# Patient Record
Sex: MALE | Race: WHITE | NOT HISPANIC OR LATINO | Employment: FULL TIME | URBAN - NONMETROPOLITAN AREA
[De-identification: names, ages, dates, MRNs, and addresses within clinical notes are randomized per-mention and may not be internally consistent; named-entity substitution may affect disease eponyms.]

---

## 2024-05-31 ENCOUNTER — OFFICE VISIT (OUTPATIENT)
Dept: FAMILY MEDICINE CLINIC | Facility: CLINIC | Age: 59
End: 2024-05-31
Payer: COMMERCIAL

## 2024-05-31 VITALS
SYSTOLIC BLOOD PRESSURE: 142 MMHG | RESPIRATION RATE: 18 BRPM | BODY MASS INDEX: 29.48 KG/M2 | OXYGEN SATURATION: 96 % | HEART RATE: 84 BPM | WEIGHT: 222.4 LBS | HEIGHT: 73 IN | DIASTOLIC BLOOD PRESSURE: 90 MMHG

## 2024-05-31 DIAGNOSIS — R53.83 OTHER FATIGUE: ICD-10-CM

## 2024-05-31 DIAGNOSIS — M79.671 FOOT PAIN, BILATERAL: ICD-10-CM

## 2024-05-31 DIAGNOSIS — R73.03 PRE-DIABETES: ICD-10-CM

## 2024-05-31 DIAGNOSIS — R05.3 CHRONIC COUGH: ICD-10-CM

## 2024-05-31 DIAGNOSIS — Z23 NEED FOR VACCINATION: ICD-10-CM

## 2024-05-31 DIAGNOSIS — I10 PRIMARY HYPERTENSION: ICD-10-CM

## 2024-05-31 DIAGNOSIS — Z00.00 ANNUAL PHYSICAL EXAM: Primary | ICD-10-CM

## 2024-05-31 DIAGNOSIS — M79.672 FOOT PAIN, BILATERAL: ICD-10-CM

## 2024-05-31 DIAGNOSIS — K50.80 CROHN'S DISEASE OF BOTH SMALL AND LARGE INTESTINE WITHOUT COMPLICATION: ICD-10-CM

## 2024-05-31 DIAGNOSIS — Z11.59 NEED FOR HEPATITIS C SCREENING TEST: ICD-10-CM

## 2024-05-31 RX ORDER — OMEPRAZOLE 20 MG/1
20 CAPSULE, DELAYED RELEASE ORAL DAILY
Qty: 30 CAPSULE | Refills: 2 | Status: SHIPPED | OUTPATIENT
Start: 2024-05-31

## 2024-05-31 RX ORDER — LOSARTAN POTASSIUM 25 MG/1
25 TABLET ORAL DAILY
Qty: 30 TABLET | Refills: 5 | Status: SHIPPED | OUTPATIENT
Start: 2024-05-31

## 2024-05-31 NOTE — LETTER
[facility]  Vaccine Consent Form    Patient Name:  Miguel Terrell  Patient :  1965     Vaccine(s) Ordered    Tdap Vaccine Greater Than or Equal To 8yo IM        Screening Checklist  The following questions should be completed prior to vaccination. If you answer “yes” to any question, it does not necessarily mean you should not be vaccinated. It just means we may need to clarify or ask more questions. If a question is unclear, please ask your healthcare provider to explain it.    Yes No   Any fever or moderate to severe illness today (mild illness and/or antibiotic treatment are not contraindications)?     Do you have a history of a serious reaction to any previous vaccinations, such as anaphylaxis, encephalopathy within 7 days, Guillain-[place] syndrome within 6 weeks, seizure?     Have you received any live vaccine(s) (e.g MMR, ISMAEL) or any other vaccines in the last month (to ensure duplicate doses aren't given)?     Do you have an anaphylactic allergy to latex (DTaP, DTaP-IPV, Hep A, Hep B, MenB, RV, Td, Tdap), baker’s yeast (Hep B, HPV), polysorbates (RSV, nirsevimab, PCV 20, Rotavirrus, Tdap, Shingrix), or gelatin (ISMAEL, MMR)?     Do you have an anaphylactic allergy to neomycin (Rabies, ISMAEL, MMR, IPV, Hep A), polymyxin B (IPV), or streptomycin (IPV)?      Any cancer, leukemia, AIDS, or other immune system disorder? (ISMAEL, MMR, RV)     Do you have a parent, brother, or sister with an immune system problem (if immune competence of vaccine recipient clinically verified, can proceed)? (MMR, ISMAEL)     Any recent steroid treatments for >2 weeks, chemotherapy, or radiation treatment? (ISMAEL, MMR)     Have you received antibody-containing blood transfusions or IVIG in the past 11 months (recommended interval is dependent on product)? (MMR, ISMAEL)     Have you taken antiviral drugs (acyclovir, famciclovir, valacyclovir for ISMAEL) in the last 24 or 48 hours, respectively?      Are you pregnant or planning to become  "pregnant within 1 month? (ISMAEL, MMR, HPV, IPV, MenB, Abrexvy; For Hep B- refer to Engerix-B; For RSV - Abrysvo is indicated for 32-36 weeks of pregnancy from September to January)     For infants, have you ever been told your child has had intussusception or a medical emergency involving obstruction of the intestine (Rotavirus)? If not for an infant, can skip this question.         *Ordering Physicians/APC should be consulted if \"yes\" is checked by the patient or guardian above.  I have received, read, and understand the Vaccine Information Statement (VIS) for each vaccine ordered.  I have considered my or my child's health status as well as the health status of my close contacts.  I have taken the opportunity to discuss my vaccine questions with my or my child's health care provider.   I have requested that the ordered vaccine(s) be given to me or my child.  I understand the benefits and risks of the vaccines.  I understand that I should remain in the clinic for 15 minutes after receiving the vaccine(s).  _________________________________________________________  Signature of Patient or Parent/Legal Guardian ____________________  Date     "

## 2024-05-31 NOTE — PROGRESS NOTES
New Patient Office Visit      Date: 24   Patient Name: Miguel Terrell  : 1965   MRN: [id number]     Chief Complaint:    Chief Complaint   Patient presents with   • Establish Care   • Annual Exam   • Cough     Patient states that he has had a persistent cough for about 2 months. States it is getting better but still there.    • Foot Pain     Bilateral, more on the left.        History of Present Illness: Miguel Terrell is a 59 y.o. male who is here today for a get acquainted visit to establish care with a new provider. I have reviewed the patient's medical history and updated the computerized patient record. Baseline screening tests were discussed today and pt agrees to discuss health maintenance and goals in future appointments.    HPI Notes:  Cough  This is a recurrent problem. The current episode started more than 1 month ago. The problem has been improved. The problem occurs intermittent. The cough is Dry.   Foot Pain  This is a chronic problem. The current episode started more than 1 year ago. The problem occurs constantly. The problem has been unchanged. Associated symptoms include coughing.        Subjective     Past Medical History:   Past Medical History:   Diagnosis Date   • Hypertension    • Prediabetes        Past Surgical History:   Past Surgical History:   Procedure Laterality Date   • COLONOSCOPY     • FRACTURE SURGERY Right     ORIF - retained hardware right wrist       Family History: History reviewed. No pertinent family history.    Social History:   Social History     Socioeconomic History   • Marital status: Single   Tobacco Use   • Smoking status: Former     Current packs/day: 0.00     Average packs/day: 1 pack/day for 20.0 years (20.0 ttl pk-yrs)     Types: Cigarettes     Start date: 1980     Quit date: 2000     Years since quittin.4   • Smokeless tobacco: Never   Vaping Use   • Vaping status: Never Used   Substance and Sexual Activity   • Alcohol use:  "Not Currently   • Drug use: Never   • Sexual activity: Not Currently       Medications:     Current Outpatient Medications:   •  losartan (COZAAR) 25 MG tablet, Take 1 tablet by mouth Daily., Disp: 30 tablet, Rfl: 5  •  omeprazole (priLOSEC) 20 MG capsule, Take 1 capsule by mouth Daily., Disp: 30 capsule, Rfl: 2    Allergies:   Allergies   Allergen Reactions   • Augmentin [Amoxicillin-Pot Clavulanate] Rash   • Amoxicillin Itching       Objective     Vital Signs:   Vitals:    05/31/24 1311   BP: 142/90   BP Location: Right arm   Patient Position: Sitting   Cuff Size: Large Adult   Pulse: 84   Resp: 18   SpO2: 96%   Weight: 101 kg (222 lb 6.4 oz)   Height: 185.4 cm (73\")   PainSc: 0-No pain     Body mass index is 29.34 kg/m².   BMI is >= 25 and <30. (Overweight) The following options were offered after discussion;: exercise counseling/recommendations and information on healthy weight added to patient's after visit summary        Physical Exam  Vitals and nursing note reviewed.   Constitutional:       General: He is not in acute distress.     Appearance: He is not toxic-appearing.   HENT:      Head: Normocephalic and atraumatic.      Right Ear: Tympanic membrane, ear canal and external ear normal.      Left Ear: Tympanic membrane, ear canal and external ear normal.      Nose: Nose normal.      Mouth/Throat:      Mouth: Mucous membranes are moist.      Dentition: Normal dentition.      Tongue: No lesions.      Palate: No mass.      Pharynx: Posterior oropharyngeal erythema present. No oropharyngeal exudate or uvula swelling.      Tonsils: No tonsillar exudate or tonsillar abscesses.   Eyes:      Extraocular Movements: Extraocular movements intact.      Conjunctiva/sclera: Conjunctivae normal.      Pupils: Pupils are equal, round, and reactive to light.   Neck:      Thyroid: No thyroid mass or thyroid tenderness.      Trachea: Trachea and phonation normal.   Cardiovascular:      Rate and Rhythm: Normal rate and regular " rhythm.      Pulses: Normal pulses.      Heart sounds: Normal heart sounds.   Pulmonary:      Effort: Pulmonary effort is normal. No respiratory distress.      Breath sounds: Normal breath sounds.   Abdominal:      General: Bowel sounds are normal. There is no distension.      Palpations: Abdomen is soft.      Tenderness: There is no abdominal tenderness.   Musculoskeletal:         General: Normal range of motion.      Cervical back: Normal range of motion and neck supple. No tenderness.      Right lower leg: No edema.      Left lower leg: No edema.   Lymphadenopathy:      Cervical: No cervical adenopathy.   Skin:     General: Skin is warm and dry.      Capillary Refill: Capillary refill takes less than 2 seconds.   Neurological:      General: No focal deficit present.      Mental Status: He is alert and oriented to person, place, and time.      Motor: No weakness.      Gait: Gait normal.   Psychiatric:         Mood and Affect: Mood normal.         Behavior: Behavior normal.         Thought Content: Thought content does not include suicidal ideation.       PHQ-9 Total Score: 0     Assessment / Plan      Assessment/Plan:   Diagnoses and all orders for this visit:    1. Annual physical exam (Primary)  Assessment & Plan:  New patient presents today for annual wellness exam with chief complaint of ongoing cough and daytime fatigue.  Available medical records reviewed today with the patient and appropriate diagnostic testing ordered.  Current medication refills provided per patient request.  Patient education materials attached to AVS related to health maintenance recommendations and healthy lifestyle. Materials were reviewed with patient during their office visit today and all questions addressed.  Patient agrees to follow-up in 2-4 weeks to discuss diagnostic testing, address health goals,  and address care gaps.    Orders:  -     CBC Auto Differential  -     Comprehensive Metabolic Panel  -     Lipid Panel    2.  Primary hypertension  Assessment & Plan:  Patient has new onset Hypertension  Ambulatory BP monitoring  Medication changes per orders.  Advised to check BP regularly and call office if frequently >140/90  Blood pressure will be reassessed in 4 weeks.    Orders:  -     losartan (COZAAR) 25 MG tablet; Take 1 tablet by mouth Daily.  Dispense: 30 tablet; Refill: 5    3. Pre-diabetes  Assessment & Plan:  Patient has history of prediabetes but has not had hemoglobin A1c in quite some time, we will check that today  No change in treatment regimen pending lab results  Patient to follow-up in 2 weeks to discuss results, health goals, and address additional care gaps.    Orders:  -     Hemoglobin A1c  -     TSH Rfx On Abnormal To Free T4    4. Foot pain, bilateral    5. Need for hepatitis C screening test  -     Hepatitis C Antibody; Future  -     Hepatitis C Antibody    6. Need for vaccination  -     Tdap Vaccine Greater Than or Equal To 6yo IM    7. Crohn's disease of both small and large intestine without complication  Assessment & Plan:  Patient mentioned history of Crohn's today during his visit, denies recent flares  No change in treatment regimen at this time, will reevaluate as needed and at routine follow-up visits      8. Chronic cough  Assessment & Plan:  Patient complains of ongoing cough, see HPI  Antihypertensive medications have been changed, we will start PPI today although he has not noticed acid brash or other GERD symptoms  Patient mentions some daytime fatigue as well as cough, he agrees to referral to ENT to evaluate for KELLEY and address chronic cough as well.  We will reevaluate as needed and at routine follow-up visits    Orders:  -     omeprazole (priLOSEC) 20 MG capsule; Take 1 capsule by mouth Daily.  Dispense: 30 capsule; Refill: 2  -     Ambulatory Referral to ENT (Otolaryngology)    9. Other fatigue  -     Ambulatory Referral to ENT (Otolaryngology)       Patient education materials attached to AVS  - Health Maintenance Recommendations. Materials were reviewed with patient during their office visit today and all questions addressed.      Follow Up:   Return in about 4 weeks (around 6/28/2024) for Recheck, Hypertension recheck.    KENZIE Wheeler (Libby) [facility]  [address] [facility] [id number]  [phone]    NOTE TO PATIENT: The 21st Century Cures Act makes medical notes like these available to patients in the interest of transparency. However, be advised this is a medical document. It is intended as peer to peer communication. It is written in medical language and may contain abbreviations or verbiage that are unfamiliar. It may appear blunt or direct. Medical documents are intended to carry relevant information, facts as evident, and the clinical opinion of the practitioner.      EMR Dragon/Transcription disclaimer:  Much of this encounter note is an electronic transcription of spoken language to printed text. Electronic transcription of spoken language may permit erroneous, or at times, nonsensical words or phrases to be inadvertently transcribed. Although I have reviewed the note for such errors, some may still exist.

## 2024-05-31 NOTE — ASSESSMENT & PLAN NOTE
Patient has new onset Hypertension  Ambulatory BP monitoring  Medication changes per orders.  Advised to check BP regularly and call office if frequently >140/90  Blood pressure will be reassessed in 4 weeks.

## 2024-06-01 LAB
ALBUMIN SERPL-MCNC: 4.6 G/DL (ref 3.8–4.9)
ALBUMIN/GLOB SERPL: 1.9 {RATIO} (ref 1.2–2.2)
ALP SERPL-CCNC: 66 IU/L (ref 44–121)
ALT SERPL-CCNC: 35 IU/L (ref 0–44)
AST SERPL-CCNC: 25 IU/L (ref 0–40)
BASOPHILS # BLD AUTO: 0.1 X10E3/UL (ref 0–0.2)
BASOPHILS NFR BLD AUTO: 1 %
BILIRUB SERPL-MCNC: 0.9 MG/DL (ref 0–1.2)
BUN SERPL-MCNC: 14 MG/DL (ref 8–27)
BUN/CREAT SERPL: 16 (ref 10–24)
CALCIUM SERPL-MCNC: 9.7 MG/DL (ref 8.6–10.2)
CHLORIDE SERPL-SCNC: 102 MMOL/L (ref 96–106)
CHOLEST SERPL-MCNC: 114 MG/DL (ref 100–199)
CO2 SERPL-SCNC: 21 MMOL/L (ref 20–29)
CREAT SERPL-MCNC: 0.85 MG/DL (ref 0.76–1.27)
EGFRCR SERPLBLD CKD-EPI 2021: 99 ML/MIN/1.73
EOSINOPHIL # BLD AUTO: 0.3 X10E3/UL (ref 0–0.4)
EOSINOPHIL NFR BLD AUTO: 5 %
ERYTHROCYTE [DISTWIDTH] IN BLOOD BY AUTOMATED COUNT: 12.7 % (ref 11.6–15.4)
GLOBULIN SER CALC-MCNC: 2.4 G/DL (ref 1.5–4.5)
GLUCOSE SERPL-MCNC: 273 MG/DL (ref 70–99)
HBA1C MFR BLD: 7.7 % (ref 4.8–5.6)
HCT VFR BLD AUTO: 49.4 % (ref 37.5–51)
HDLC SERPL-MCNC: 34 MG/DL
HGB BLD-MCNC: 17 G/DL (ref 13–17.7)
IMM GRANULOCYTES # BLD AUTO: 0 X10E3/UL (ref 0–0.1)
IMM GRANULOCYTES NFR BLD AUTO: 0 %
LDLC SERPL CALC-MCNC: 49 MG/DL (ref 0–99)
LYMPHOCYTES # BLD AUTO: 1.2 X10E3/UL (ref 0.7–3.1)
LYMPHOCYTES NFR BLD AUTO: 17 %
MCH RBC QN AUTO: 31.3 PG (ref 26.6–33)
MCHC RBC AUTO-ENTMCNC: 34.4 G/DL (ref 31.5–35.7)
MCV RBC AUTO: 91 FL (ref 79–97)
MONOCYTES # BLD AUTO: 0.5 X10E3/UL (ref 0.1–0.9)
MONOCYTES NFR BLD AUTO: 8 %
NEUTROPHILS # BLD AUTO: 4.7 X10E3/UL (ref 1.4–7)
NEUTROPHILS NFR BLD AUTO: 69 %
PLATELET # BLD AUTO: 255 X10E3/UL (ref 150–450)
POTASSIUM SERPL-SCNC: 4 MMOL/L (ref 3.5–5.2)
PROT SERPL-MCNC: 7 G/DL (ref 6–8.5)
RBC # BLD AUTO: 5.43 X10E6/UL (ref 4.14–5.8)
SODIUM SERPL-SCNC: 137 MMOL/L (ref 134–144)
TRIGL SERPL-MCNC: 187 MG/DL (ref 0–149)
TSH SERPL DL<=0.005 MIU/L-ACNC: 0.83 UIU/ML (ref 0.45–4.5)
VLDLC SERPL CALC-MCNC: 31 MG/DL (ref 5–40)
WBC # BLD AUTO: 6.8 X10E3/UL (ref 3.4–10.8)

## 2024-06-02 PROBLEM — R05.3 CHRONIC COUGH: Status: ACTIVE | Noted: 2024-06-02

## 2024-06-02 NOTE — ASSESSMENT & PLAN NOTE
Patient mentioned history of Crohn's today during his visit, denies recent flares  No change in treatment regimen at this time, will reevaluate as needed and at routine follow-up visits

## 2024-06-02 NOTE — ASSESSMENT & PLAN NOTE
New patient presents today for annual wellness exam with chief complaint of ongoing cough and daytime fatigue.  Available medical records reviewed today with the patient and appropriate diagnostic testing ordered.  Current medication refills provided per patient request.  Patient education materials attached to AVS related to health maintenance recommendations and healthy lifestyle. Materials were reviewed with patient during their office visit today and all questions addressed.  Patient agrees to follow-up in 2-4 weeks to discuss diagnostic testing, address health goals,  and address care gaps.

## 2024-06-02 NOTE — ASSESSMENT & PLAN NOTE
Patient complains of ongoing cough, see HPI  Antihypertensive medications have been changed, we will start PPI today although he has not noticed acid brash or other GERD symptoms  Patient mentions some daytime fatigue as well as cough, he agrees to referral to ENT to evaluate for KELLEY and address chronic cough as well.  We will reevaluate as needed and at routine follow-up visits

## 2024-06-02 NOTE — ASSESSMENT & PLAN NOTE
Patient has history of prediabetes but has not had hemoglobin A1c in quite some time, we will check that today  No change in treatment regimen pending lab results  Patient to follow-up in 2 weeks to discuss results, health goals, and address additional care gaps.

## 2024-06-28 ENCOUNTER — OFFICE VISIT (OUTPATIENT)
Dept: FAMILY MEDICINE CLINIC | Facility: CLINIC | Age: 59
End: 2024-06-28
Payer: COMMERCIAL

## 2024-06-28 VITALS
OXYGEN SATURATION: 98 % | HEART RATE: 78 BPM | SYSTOLIC BLOOD PRESSURE: 140 MMHG | BODY MASS INDEX: 27.66 KG/M2 | WEIGHT: 222.5 LBS | DIASTOLIC BLOOD PRESSURE: 90 MMHG | RESPIRATION RATE: 18 BRPM | HEIGHT: 75 IN

## 2024-06-28 DIAGNOSIS — I10 PRIMARY HYPERTENSION: Primary | ICD-10-CM

## 2024-06-28 DIAGNOSIS — K50.80 CROHN'S DISEASE OF BOTH SMALL AND LARGE INTESTINE WITHOUT COMPLICATION: ICD-10-CM

## 2024-06-28 DIAGNOSIS — R05.3 CHRONIC COUGH: ICD-10-CM

## 2024-06-28 DIAGNOSIS — E11.43 TYPE 2 DIABETES MELLITUS WITH DIABETIC AUTONOMIC NEUROPATHY, WITHOUT LONG-TERM CURRENT USE OF INSULIN: ICD-10-CM

## 2024-06-28 PROBLEM — R73.03 PRE-DIABETES: Status: RESOLVED | Noted: 2024-05-31 | Resolved: 2024-06-28

## 2024-06-28 PROCEDURE — 99214 OFFICE O/P EST MOD 30 MIN: CPT

## 2024-06-28 RX ORDER — LOSARTAN POTASSIUM 50 MG/1
50 TABLET ORAL DAILY
Qty: 30 TABLET | Refills: 5 | Status: SHIPPED | OUTPATIENT
Start: 2024-06-28

## 2024-06-28 RX ORDER — BLOOD-GLUCOSE METER
1 KIT MISCELLANEOUS 2 TIMES DAILY
Qty: 1 EACH | Refills: 1 | Status: SHIPPED | OUTPATIENT
Start: 2024-06-28

## 2024-06-28 RX ORDER — LANCETS 28 GAUGE
EACH MISCELLANEOUS
Qty: 100 EACH | Refills: 12 | Status: SHIPPED | OUTPATIENT
Start: 2024-06-28

## 2024-06-28 NOTE — PROGRESS NOTES
Follow Up Office Visit      Date:  2024   Patient Name: Miguel Terrell  : 1965   MRN: [id number]     Chief Complaint:    Chief Complaint   Patient presents with    Hypertension     Follow up    Results     Follow up on labs.        History of Present Illness: Miguel Terrell is a 59 y.o. male who is here today to follow up on hypertension and lab results.     Bowel issues  Constipation intermittently  Type 1-2 [place] stool scale  Has increased water intake and decreased intake of soft drinks    Type II  Has been on Metformin in the past, stopped taking when he ran out   Was not checking blood sugars  Neuropathy in feet - tingling in feet    Cough has improved    Subjective      Answers submitted by the patient for this visit:  Office Visit on 2024  8:15 AM with Twaana Nettles  Primary Reason for Visit (Submitted on 2024)  What is the primary reason for your visit?: Diabetes  Diabetes Questionnaire (Submitted on 2024)  Chief Complaint: Diabetes problem  Diabetes type: type 2  MedicAlert ID: No  Disease duration: 1 Months  Treatment compliance: most of the time  Symptom course: improving  blurred vision: No  foot paresthesias: Yes  polydipsia: No  polyuria: No  weight loss: Yes  blackouts: No  hospitalization: No  nocturnal hypoglycemia: No  required assistance: No  required glucagon: No  confusion: No  headaches: No  speech difficulty: No  sweats: No  tremors: No  Current diet: generally healthy  Meal planning: avoidance of concentrated sweets  Exercise: intermittently  Eye exam current: Yes  Sees podiatrist: No      Past Medical History:   Diagnosis Date    Diabetes mellitus     Hypertension     Inflammatory bowel disease 2018    Pre-diabetes 2024    Goal: HgbA1c < 7       Prediabetes        Past Surgical History:   Procedure Laterality Date    COLONOSCOPY  2019    FRACTURE SURGERY Right     ORIF - retained hardware right wrist       Family History   Problem Relation  Age of Onset    Cancer Father     Kidney disease Father     Cancer Maternal Grandfather     Cancer Brother        Social History     Socioeconomic History    Marital status: Single   Tobacco Use    Smoking status: Former     Current packs/day: 0.00     Average packs/day: 1 pack/day for 20.0 years (20.0 ttl pk-yrs)     Types: Cigarettes     Start date: 1980     Quit date: 2000     Years since quittin.5    Smokeless tobacco: Never   Vaping Use    Vaping status: Never Used   Substance and Sexual Activity    Alcohol use: Not Currently    Drug use: Never    Sexual activity: Not Currently         Current Outpatient Medications:     omeprazole (priLOSEC) 20 MG capsule, Take 1 capsule by mouth Daily., Disp: 30 capsule, Rfl: 2    Blood Glucose Monitoring Suppl (FreeStyle Freedom Lite) w/Device kit, Use 1 each 2 (Two) Times a Day., Disp: 1 each, Rfl: 1    Lancets (freestyle) lancets, , Disp: 100 each, Rfl: 12    losartan (COZAAR) 50 MG tablet, Take 1 tablet by mouth Daily., Disp: 30 tablet, Rfl: 5    metFORMIN (GLUCOPHAGE) 500 MG tablet, Take 1 tablet by mouth 2 (Two) Times a Day With Meals., Disp: 60 tablet, Rfl: 3    Allergies   Allergen Reactions    Augmentin [Amoxicillin-Pot Clavulanate] Rash    Amoxicillin Itching       Objective     Physical Exam  Vitals and nursing note reviewed.   Constitutional:       General: He is not in acute distress.     Appearance: Normal appearance. He is not toxic-appearing.   HENT:      Head: Normocephalic.   Eyes:      Pupils: Pupils are equal, round, and reactive to light.   Cardiovascular:      Rate and Rhythm: Normal rate and regular rhythm.      Heart sounds: No murmur heard.  Pulmonary:      Effort: Pulmonary effort is normal. No respiratory distress.      Breath sounds: Normal breath sounds.   Musculoskeletal:         General: Normal range of motion.      Cervical back: Normal range of motion and neck supple.      Right lower leg: No edema.      Left lower leg: No edema.  "  Skin:     General: Skin is warm and dry.   Neurological:      Mental Status: He is alert.   Psychiatric:         Mood and Affect: Mood normal.         Behavior: Behavior normal.       Vitals:    06/28/24 0823   BP: 140/90   BP Location: Left arm   Patient Position: Sitting   Cuff Size: Adult   Pulse: 78   Resp: 18   SpO2: 98%   Weight: 101 kg (222 lb 8 oz)   Height: 189.5 cm (74.61\")   PainSc: 0-No pain     Body mass index is 28.11 kg/m².        Assessment / Plan      Diagnoses and all orders for this visit:    1. Primary hypertension (Primary)  Assessment & Plan:  Hypertension is  improving with treatment.  Blood pressure remains elevated today.  Medication changes per orders.  Blood pressure will be reassessedin 3 months.  Patient agrees to continue to monitor blood pressures and if his blood pressures remain consistently above 140/90 patient will return earlier than scheduled follow-up visit.    Orders:  -     losartan (COZAAR) 50 MG tablet; Take 1 tablet by mouth Daily.  Dispense: 30 tablet; Refill: 5    2. Chronic cough  Assessment & Plan:  Patient reports cough is improving since his last visit.  He is continuing to take PPI and has appointment scheduled with Dr. Ray for 7/16/2024.  No change in treatment regimen today.  Will re- evaluate as needed or at routine follow-up visits.      3. Type 2 diabetes mellitus with diabetic autonomic neuropathy, without long-term current use of insulin  Assessment & Plan:  Diabetes is newly identified.   Medication changes per orders.  Recommended an ADA diet.  Discussed foot care.  Reminded to get yearly retinal exam.  Patient education materials attached to AVS related to type 2 diabetes management. Materials were reviewed with patient during their office visit today and all questions addressed.  Diabetes will be reassessed in 3 months    Orders:  -     Blood Glucose Monitoring Suppl (FreeStyle Freedom Lite) w/Device kit; Use 1 each 2 (Two) Times a Day.  Dispense: 1 " each; Refill: 1  -     metFORMIN (GLUCOPHAGE) 500 MG tablet; Take 1 tablet by mouth 2 (Two) Times a Day With Meals.  Dispense: 60 tablet; Refill: 3  -     Lancets (freestyle) lancets  Dispense: 100 each; Refill: 12    4. Crohn's disease of both small and large intestine without complication  Assessment & Plan:  Patient complains of some increased issues with constipation.  He has made changes to his diet since seeing his hemoglobin A1c result.  Patient describes bowel movements as type I-II on the Real stool scale.  Encourage patient to increase daily water intake as he continues to decrease his intake of soft drinks.  We also discussed adding MiraLAX to his daily regimen as needed.  We will re- evaluate as needed or at routine follow-up visits unless otherwise indicated.        Most recent diagnostic testing results were reviewed and discussed with the patient during their appointment today and all questions addressed to patient's satisfaction.     Return in about 3 months (around 9/28/2024) for Diabetes recheck.    KENZIE Wheeler (Libby) [facility]  [address] [place] [id number] [phone]    NOTE TO PATIENT: The 21st Century Cures Act makes medical notes like these available to patients in the interest of transparency. However, be advised this is a medical document. It is intended as peer to peer communication. It is written in medical language and may contain abbreviations or verbiage that are unfamiliar. It may appear blunt or direct. Medical documents are intended to carry relevant information, facts as evident, and the clinical opinion of the practitioner.     EMR Dragon/Transcription disclaimer:  Much of this encounter note is an electronic transcription of spoken language to printed text. Electronic transcription of spoken language may permit erroneous, or at times, nonsensical words or phrases to be inadvertently transcribed. Although  I have reviewed the note for such errors, some may still exist.

## 2024-06-28 NOTE — PATIENT INSTRUCTIONS
The American Heart Association Diet and Lifestyle Recommendations  A healthy diet and lifestyle are the keys to preventing and managing cardiovascular disease. It’s not as hard as you may think!  Remember, it's the overall pattern of your choices that counts. Make the simple steps below part of your life for long-term benefits to your health and your heart.    Use up at least as many calories as you take in.  Start by knowing how many calories you should be eating and drinking to maintain your weight. Nutrition and calorie information on food labels is typically based on a 2,000 calorie per day diet. You may need fewer or more calories depending on several factors including age, gender, and level of physical activity.    Increase the amount and intensity of your physical activity to burn more calories.    Aim for at least 150 minutes of moderate physical activity or 75 minutes of vigorous physical activity (or an equal combination of both) each week.  Regular physical activity can help you maintain your weight, keep off weight that you lose and reach physical and cardiovascular fitness. If it’s hard to schedule regular exercise, look for ways to build short bursts of activity into your daily routine such as parking farther away and taking the stairs instead of the elevator. Ideally, your activity should be spread throughout the week.    Eat an overall healthy dietary pattern that emphasizes:  a wide variety of fruits and vegetables  whole grains and products made up mostly of whole grains  healthy sources of protein (mostly plants such as legumes and nuts; fish and seafood; low-fat or nonfat dairy; and, if you eat meat and poultry, ensuring it is lean and unprocessed)  liquid non-tropical vegetable oils  minimally processed foods  minimized intake of added sugars  foods prepared with little or no salt  limited or preferably no alcohol intake  Apply this guidance wherever food is prepared or  consumed.  It is possible to follow a heart-healthy dietary pattern regardless of whether food is prepared at home, ordered in a restaurant or online, or purchased as a prepared meal. Read the Nutrition Facts and ingredient list on packaged food labels to choose those with less sodium, added sugars and saturated fat. Look for the Heart-Check maida to find foods that have been certified by the American Heart Association as heart-healthy.     Live Tobacco Free  Don’t smoke, vape or use tobacco or nicotine products -- and avoid secondhand smoke or vapor.

## 2024-07-08 PROBLEM — E11.43 TYPE 2 DIABETES MELLITUS WITH DIABETIC AUTONOMIC NEUROPATHY, WITHOUT LONG-TERM CURRENT USE OF INSULIN: Status: ACTIVE | Noted: 2024-07-08

## 2024-07-08 NOTE — ASSESSMENT & PLAN NOTE
Hypertension is  improving with treatment.  Blood pressure remains elevated today.  Medication changes per orders.  Blood pressure will be reassessedin 3 months.  Patient agrees to continue to monitor blood pressures and if his blood pressures remain consistently above 140/90 patient will return earlier than scheduled follow-up visit.

## 2024-07-08 NOTE — ASSESSMENT & PLAN NOTE
Diabetes is newly identified.   Medication changes per orders.  Recommended an ADA diet.  Discussed foot care.  Reminded to get yearly retinal exam.  Patient education materials attached to AVS related to type 2 diabetes management. Materials were reviewed with patient during their office visit today and all questions addressed.  Diabetes will be reassessed in 3 months

## 2024-07-08 NOTE — ASSESSMENT & PLAN NOTE
Patient complains of some increased issues with constipation.  He has made changes to his diet since seeing his hemoglobin A1c result.  Patient describes bowel movements as type I-II on the Moore stool scale.  Encourage patient to increase daily water intake as he continues to decrease his intake of soft drinks.  We also discussed adding MiraLAX to his daily regimen as needed.  We will re- evaluate as needed or at routine follow-up visits unless otherwise indicated.

## 2024-07-08 NOTE — ASSESSMENT & PLAN NOTE
Patient reports cough is improving since his last visit.  He is continuing to take PPI and has appointment scheduled with Dr. Ray for 7/16/2024.  No change in treatment regimen today.  Will re- evaluate as needed or at routine follow-up visits.

## 2024-09-27 ENCOUNTER — OFFICE VISIT (OUTPATIENT)
Dept: FAMILY MEDICINE CLINIC | Facility: CLINIC | Age: 59
End: 2024-09-27
Payer: COMMERCIAL

## 2024-09-27 VITALS
HEART RATE: 82 BPM | SYSTOLIC BLOOD PRESSURE: 130 MMHG | OXYGEN SATURATION: 98 % | BODY MASS INDEX: 26.7 KG/M2 | RESPIRATION RATE: 16 BRPM | WEIGHT: 214.7 LBS | DIASTOLIC BLOOD PRESSURE: 80 MMHG | HEIGHT: 75 IN

## 2024-09-27 DIAGNOSIS — N20.0 LEFT RENAL STONE: ICD-10-CM

## 2024-09-27 DIAGNOSIS — E11.43 TYPE 2 DIABETES MELLITUS WITH DIABETIC AUTONOMIC NEUROPATHY, WITHOUT LONG-TERM CURRENT USE OF INSULIN: Primary | ICD-10-CM

## 2024-09-27 DIAGNOSIS — I10 PRIMARY HYPERTENSION: ICD-10-CM

## 2024-09-27 DIAGNOSIS — Z23 NEED FOR VACCINATION: ICD-10-CM

## 2024-09-27 PROBLEM — E87.6 HYPOKALEMIA: Status: ACTIVE | Noted: 2024-09-26

## 2024-09-27 PROBLEM — S52.509A CLOSED FRACTURE OF DISTAL END OF RADIUS: Status: RESOLVED | Noted: 2021-02-17 | Resolved: 2024-09-27

## 2024-09-27 PROBLEM — N20.1 URETERIC STONE: Status: ACTIVE | Noted: 2024-09-26

## 2024-09-27 LAB
EXPIRATION DATE: ABNORMAL
HBA1C MFR BLD: 6.3 % (ref 4.5–5.7)
Lab: ABNORMAL

## 2024-09-27 PROCEDURE — 99214 OFFICE O/P EST MOD 30 MIN: CPT

## 2024-09-27 PROCEDURE — 90656 IIV3 VACC NO PRSV 0.5 ML IM: CPT

## 2024-09-27 PROCEDURE — 90471 IMMUNIZATION ADMIN: CPT

## 2024-09-27 RX ORDER — LOSARTAN POTASSIUM 50 MG/1
50 TABLET ORAL DAILY
Qty: 90 TABLET | Refills: 1 | Status: SHIPPED | OUTPATIENT
Start: 2024-09-27

## 2024-09-27 RX ORDER — FLOMAX 0.4 MG/1
0.4 CAPSULE ORAL DAILY
COMMUNITY
Start: 2024-09-25 | End: 2024-09-27 | Stop reason: SDUPTHER

## 2024-09-27 RX ORDER — ONDANSETRON 4 MG/1
4 TABLET, ORALLY DISINTEGRATING ORAL EVERY 6 HOURS
Qty: 15 TABLET | Refills: 1 | Status: SHIPPED | OUTPATIENT
Start: 2024-09-27

## 2024-09-27 RX ORDER — POTASSIUM CHLORIDE 750 MG/1
20 TABLET, FILM COATED, EXTENDED RELEASE ORAL
COMMUNITY
Start: 2024-09-26

## 2024-09-27 RX ORDER — ONDANSETRON 4 MG/1
4 TABLET, ORALLY DISINTEGRATING ORAL EVERY 6 HOURS
COMMUNITY
Start: 2024-09-25 | End: 2024-09-27 | Stop reason: SDUPTHER

## 2024-09-27 RX ORDER — FLOMAX 0.4 MG/1
0.4 CAPSULE ORAL DAILY
Qty: 30 CAPSULE | Refills: 0 | Status: SHIPPED | OUTPATIENT
Start: 2024-09-27

## 2024-09-27 RX ORDER — OXYCODONE AND ACETAMINOPHEN 5; 325 MG/1; MG/1
1 TABLET ORAL ONCE AS NEEDED
COMMUNITY
Start: 2024-09-25

## 2024-09-27 NOTE — PROGRESS NOTES
Follow Up Office Visit      Date:  2024   Patient Name: Miguel Terrell  : 1965   MRN: [id number]     Chief Complaint   Patient presents with    Diabetes     Follow up     Hypertension     Follow up     Hospital Follow Up Visit     Patient went to the hospital the last 2 days due to pain in the kidneys, patient states he has kidney stones.        History of Present Illness: Miguel Terrell is a 59 y.o. male who is here today for follow up on diabetes, hypertension, and ER follow up. Pt went to ER with left flank pain and was diagnosed with 3mm renal stone. Pt treated and released with medications and instructions to f/u with urology. Pt has not scheduled yet with Dr. Mcpherson but plans to call their office today.  Currently taking Flomax, some mild discomfort but reports pain minimal at this time.       Subjective      Past Medical History:   Diagnosis Date    Closed fracture of distal end of radius 2021    Other intraarticular fracture of lower end of right radius, subsequent encounter for closed fracture with routine healing  Other closed intra-articular fracture of distal end of right radius with routine healing, subsequent encounter  Other closed intra-articular fracture of distal end of right radius, initial encounter      Diabetes mellitus     Hypertension     Inflammatory bowel disease 2018    Pre-diabetes 2024    Goal: HgbA1c < 7       Prediabetes        Past Surgical History:   Procedure Laterality Date    COLONOSCOPY  2019    FRACTURE SURGERY Right     ORIF - retained hardware right wrist       Family History   Problem Relation Age of Onset    Cancer Father     Kidney disease Father     Cancer Maternal Grandfather     Cancer Brother        Social History     Socioeconomic History    Marital status: Single   Tobacco Use    Smoking status: Former     Current packs/day: 0.00     Average packs/day: 1 pack/day for 20.0 years (20.0 ttl pk-yrs)     Types: Cigarettes     Start  date: 1980     Quit date: 2000     Years since quittin.8    Smokeless tobacco: Never   Vaping Use    Vaping status: Never Used   Substance and Sexual Activity    Alcohol use: Not Currently    Drug use: Never    Sexual activity: Not Currently         Current Outpatient Medications:     Blood Glucose Monitoring Suppl (FreeStyle Freedom Lite) w/Device kit, Use 1 each 2 (Two) Times a Day., Disp: 1 each, Rfl: 1    Flomax 0.4 MG capsule 24 hr capsule, Take 1 capsule by mouth Daily., Disp: 30 capsule, Rfl: 0    Lancets (freestyle) lancets, , Disp: 100 each, Rfl: 12    losartan (COZAAR) 50 MG tablet, Take 1 tablet by mouth Daily., Disp: 90 tablet, Rfl: 1    metFORMIN (GLUCOPHAGE) 500 MG tablet, Take 1 tablet by mouth 2 (Two) Times a Day With Meals., Disp: 60 tablet, Rfl: 3    ondansetron ODT (ZOFRAN-ODT) 4 MG disintegrating tablet, Take 1 tablet by mouth Every 6 (Six) Hours., Disp: 15 tablet, Rfl: 1    oxyCODONE-acetaminophen (PERCOCET) 5-325 MG per tablet, Take 1 tablet by mouth 1 (One) Time As Needed., Disp: , Rfl:     potassium chloride (K-DUR,KLOR-CON) 10 MEQ ER tablet, Take 2 tablets by mouth., Disp: , Rfl:     Allergies   Allergen Reactions    Augmentin [Amoxicillin-Pot Clavulanate] Rash    Amoxicillin Itching, Hives and Unknown - High Severity       Objective     Physical Exam  Vitals and nursing note reviewed.   Constitutional:       General: He is not in acute distress.     Appearance: Normal appearance. He is not toxic-appearing.   HENT:      Head: Normocephalic.   Eyes:      Pupils: Pupils are equal, round, and reactive to light.   Cardiovascular:      Rate and Rhythm: Normal rate and regular rhythm.      Heart sounds: No murmur heard.  Pulmonary:      Effort: Pulmonary effort is normal. No respiratory distress.      Breath sounds: Normal breath sounds.   Musculoskeletal:         General: Normal range of motion.      Cervical back: Normal range of motion and neck supple.      Right lower leg: No  "edema.      Left lower leg: No edema.   Skin:     General: Skin is warm and dry.   Neurological:      Mental Status: He is alert.   Psychiatric:         Mood and Affect: Mood normal.         Behavior: Behavior normal.       Vitals:    09/27/24 0810   BP: 130/80   BP Location: Right arm   Patient Position: Sitting   Cuff Size: Adult   Pulse: 82   Resp: 16   SpO2: 98%   Weight: 97.4 kg (214 lb 11.2 oz)   Height: 189.5 cm (74.61\")     Body mass index is 27.12 kg/m².     No results found for this or any previous visit.     The ASCVD Risk score (Lakeshia COLEMAN, et al., 2019) failed to calculate for the following reasons:    The valid total cholesterol range is 130 to 320 mg/dL      Assessment / Plan      Diagnoses and all orders for this visit:    1. Type 2 diabetes mellitus with diabetic autonomic neuropathy, without long-term current use of insulin (Primary)  Assessment & Plan:  DM 2, p.o. meds, diet, and lifestyle  Current medications:   losartan  metFORMIN  Compliant with all medications.    On ACEI: Yes    Denies hypoglycemic events:   Checking blood sugars at home: Yes  Frequency: 3-4 times per week (120s)  Denies foot pain or paresthesia  Seen by ophthalmologist within the past 12 months  Following low-carb, low-fat diet and exercising regularly: Yes     Orders:  -     POC Glycosylated Hemoglobin (Hb A1C)  -     metFORMIN (GLUCOPHAGE) 500 MG tablet; Take 1 tablet by mouth 2 (Two) Times a Day With Meals.  Dispense: 60 tablet; Refill: 3    2. Primary hypertension  Assessment & Plan:  Hypertension stable  Taking losartan, no missed doses, denies side effects - meds refilled today.   Does not report any headaches, blurry  vision, dizziness, chest pain, shortness of breath, or palpitations.    Recommended low sodium diet, moderate daily walking at least 30 minutes a day 5 days a week  No change in treatment regimen, will recheck hypertension as needed and at routine follow-up visits  Sleep Study completed - mild KELLEY, no " machine, diet and weight loss recommeded  Re-check with ENT if no improvement in sleep.    Orders:  -     Basic Metabolic Panel  -     CBC Auto Differential  -     Lipid Panel  -     losartan (COZAAR) 50 MG tablet; Take 1 tablet by mouth Daily.  Dispense: 90 tablet; Refill: 1    3. Left renal stone  Assessment & Plan:  Recent ER trip   3mm stone left   Pt to f/u with Dr. Mcpherson - has not called them yet  Denies fevers, chills, sweats    Orders:  -     Flomax 0.4 MG capsule 24 hr capsule; Take 1 capsule by mouth Daily.  Dispense: 30 capsule; Refill: 0  -     ondansetron ODT (ZOFRAN-ODT) 4 MG disintegrating tablet; Take 1 tablet by mouth Every 6 (Six) Hours.  Dispense: 15 tablet; Refill: 1    4. Need for vaccination  -     Fluzone >6mos    Other orders  -     CBC & Differential    Patient Education:   Reviewed medications, potential side effects and signs and symptoms to report.   Discussed risk vs benefits of treatment plan with patient including medications, labs, and imaging.    Patient education materials attached to AVS and reviewed with patient during office visit today.   Addressed questions and concerns during visit. Patient verbalized understanding and agrees with tx plan.   Instructed to call the office with any questions and report to ER with any life-threatening symptoms.    Return in about 3 months (around 12/27/2024) for Recheck.    KENZIE Wheeler (Libby)  [facility]   [address] [id number]  [phone]    NOTE TO PATIENT: The 21st Century Cures Act makes medical notes like these available to patients in the interest of transparency. However, be advised this is a medical document. It is intended as peer to peer communication. It is written in medical language and may contain abbreviations or verbiage that are unfamiliar. It may appear blunt or direct. Medical documents are intended to carry relevant information, facts as evident, and the clinical  opinion of the practitioner.      EMR Dragon/Transcription disclaimer: Much of this encounter note is an electronic transcription of spoken language to printed text. Electronic transcription of spoken language may permit erroneous, or at times, nonsensical words or phrases to be inadvertently transcribed. Although I have reviewed the note for such errors, some may still exist.

## 2024-09-27 NOTE — ASSESSMENT & PLAN NOTE
Recent ER trip   3mm stone left   Pt to f/u with Dr. Mcpherson - has not called them yet  Denies fevers, chills, sweats

## 2024-09-27 NOTE — LETTER
September 27, 2024     Patient: Miguel Terrell   YOB: 1965   Date of Visit: 9/27/2024       To Whom It May Concern:    It is my medical opinion that Miguel Terrell  may have intermittent periods of incapacity related to recent diagnosis of kidney stone. He may intermittently have pain requiring medications that could cause mild, transient impairment in his ability to travel and/or operate a motor vehicle. Condition is expected to persist until renal stone is passed .           Sincerely,        KENZIE Hoffman    CC:   No Recipients

## 2024-09-27 NOTE — ASSESSMENT & PLAN NOTE
Hypertension stable  Taking losartan, no missed doses, denies side effects - meds refilled today.   Does not report any headaches, blurry  vision, dizziness, chest pain, shortness of breath, or palpitations.    Recommended low sodium diet, moderate daily walking at least 30 minutes a day 5 days a week  No change in treatment regimen, will recheck hypertension as needed and at routine follow-up visits  Sleep Study completed - mild KELLEY, no machine, diet and weight loss recommeded  Re-check with ENT if no improvement in sleep.

## 2024-09-27 NOTE — ASSESSMENT & PLAN NOTE
DM 2, p.o. meds, diet, and lifestyle  Current medications:   losartan  metFORMIN  Compliant with all medications.    On ACEI: Yes    Denies hypoglycemic events:   Checking blood sugars at home: Yes  Frequency: 3-4 times per week (120s)  Denies foot pain or paresthesia  Seen by ophthalmologist within the past 12 months  Following low-carb, low-fat diet and exercising regularly: Yes

## 2024-09-28 LAB
BASOPHILS # BLD AUTO: 0 X10E3/UL (ref 0–0.2)
BASOPHILS NFR BLD AUTO: 0 %
BUN SERPL-MCNC: 16 MG/DL (ref 8–27)
BUN/CREAT SERPL: 17 (ref 10–24)
CALCIUM SERPL-MCNC: 9.6 MG/DL (ref 8.6–10.2)
CHLORIDE SERPL-SCNC: 102 MMOL/L (ref 96–106)
CHOLEST SERPL-MCNC: 107 MG/DL (ref 100–199)
CO2 SERPL-SCNC: 23 MMOL/L (ref 20–29)
CREAT SERPL-MCNC: 0.93 MG/DL (ref 0.76–1.27)
EGFRCR SERPLBLD CKD-EPI 2021: 94 ML/MIN/1.73
EOSINOPHIL # BLD AUTO: 0.1 X10E3/UL (ref 0–0.4)
EOSINOPHIL NFR BLD AUTO: 2 %
ERYTHROCYTE [DISTWIDTH] IN BLOOD BY AUTOMATED COUNT: 13.3 % (ref 11.6–15.4)
GLUCOSE SERPL-MCNC: 122 MG/DL (ref 70–99)
HCT VFR BLD AUTO: 46 % (ref 37.5–51)
HDLC SERPL-MCNC: 38 MG/DL
HGB BLD-MCNC: 14.9 G/DL (ref 13–17.7)
IMM GRANULOCYTES # BLD AUTO: 0 X10E3/UL (ref 0–0.1)
IMM GRANULOCYTES NFR BLD AUTO: 0 %
LDLC SERPL CALC-MCNC: 53 MG/DL (ref 0–99)
LYMPHOCYTES # BLD AUTO: 1 X10E3/UL (ref 0.7–3.1)
LYMPHOCYTES NFR BLD AUTO: 14 %
MCH RBC QN AUTO: 30.8 PG (ref 26.6–33)
MCHC RBC AUTO-ENTMCNC: 32.4 G/DL (ref 31.5–35.7)
MCV RBC AUTO: 95 FL (ref 79–97)
MONOCYTES # BLD AUTO: 0.6 X10E3/UL (ref 0.1–0.9)
MONOCYTES NFR BLD AUTO: 9 %
NEUTROPHILS # BLD AUTO: 5 X10E3/UL (ref 1.4–7)
NEUTROPHILS NFR BLD AUTO: 75 %
PLATELET # BLD AUTO: 220 X10E3/UL (ref 150–450)
POTASSIUM SERPL-SCNC: 3.6 MMOL/L (ref 3.5–5.2)
RBC # BLD AUTO: 4.84 X10E6/UL (ref 4.14–5.8)
SODIUM SERPL-SCNC: 139 MMOL/L (ref 134–144)
TRIGL SERPL-MCNC: 80 MG/DL (ref 0–149)
VLDLC SERPL CALC-MCNC: 16 MG/DL (ref 5–40)
WBC # BLD AUTO: 6.7 X10E3/UL (ref 3.4–10.8)

## 2024-09-30 ENCOUNTER — TELEPHONE (OUTPATIENT)
Dept: FAMILY MEDICINE CLINIC | Facility: CLINIC | Age: 59
End: 2024-09-30
Payer: COMMERCIAL

## 2025-01-08 ENCOUNTER — OFFICE VISIT (OUTPATIENT)
Dept: FAMILY MEDICINE CLINIC | Facility: CLINIC | Age: 60
End: 2025-01-08
Payer: COMMERCIAL

## 2025-01-08 VITALS
WEIGHT: 219.4 LBS | OXYGEN SATURATION: 95 % | DIASTOLIC BLOOD PRESSURE: 80 MMHG | HEART RATE: 83 BPM | RESPIRATION RATE: 16 BRPM | TEMPERATURE: 98.9 F | BODY MASS INDEX: 27.28 KG/M2 | SYSTOLIC BLOOD PRESSURE: 146 MMHG | HEIGHT: 75 IN

## 2025-01-08 DIAGNOSIS — N20.0 LEFT RENAL STONE: ICD-10-CM

## 2025-01-08 DIAGNOSIS — E11.43 TYPE 2 DIABETES MELLITUS WITH DIABETIC AUTONOMIC NEUROPATHY, WITHOUT LONG-TERM CURRENT USE OF INSULIN: Primary | ICD-10-CM

## 2025-01-08 DIAGNOSIS — I10 PRIMARY HYPERTENSION: ICD-10-CM

## 2025-01-08 DIAGNOSIS — Z00.00 ANNUAL PHYSICAL EXAM: ICD-10-CM

## 2025-01-08 DIAGNOSIS — Z12.11 ENCOUNTER FOR SCREENING FOR MALIGNANT NEOPLASM OF COLON: ICD-10-CM

## 2025-01-08 PROCEDURE — 90471 IMMUNIZATION ADMIN: CPT

## 2025-01-08 PROCEDURE — 90677 PCV20 VACCINE IM: CPT

## 2025-01-08 PROCEDURE — 99214 OFFICE O/P EST MOD 30 MIN: CPT

## 2025-01-08 NOTE — ASSESSMENT & PLAN NOTE
DM II improving with medications, diet and lifestyle changes  Compliant with medications     No hypoglycemic events  Checking blood sugars at home daily   History of neuropathy bilateral lower extremities-stable   Overdue for vision exam-pt to make appt with opth provider  Following low-carb, low-fat diet and exercising regularly    Plan:   Continue current medications  Medication changes - see orders  Continue diet and lifestyle changes  Follow up in  months, unless otherwise indicated  We will complete diabetic foot exam at annual wellness visit in May

## 2025-01-08 NOTE — ASSESSMENT & PLAN NOTE
Hypertension - elevated today- believes he forgot his morning dose of BP medication today  Endorses 1-2 missed doses per week, denies side effects   Does not report any headaches, blurry vision, dizziness, chest pain, shortness of breath, or palpitations.    Discussed barriers to taking medications consistently - pt to work on strategies to improve med compliance    Plan:  Continue current medications  Report back to the office if blood pressures consistently >140/90  Pt verbalizes understanding if he develops chest pain, shortness of breath or other alarm symptoms he should call 911 or go to the ER as appropriate.

## 2025-01-08 NOTE — PATIENT INSTRUCTIONS

## 2025-01-08 NOTE — ASSESSMENT & PLAN NOTE
Previously passed renal calculi - at home following ER visit  Pt seen initially by Dr. Mcpherson and then for 1 f/u visit   No longer taking medications for renal stone  Pt to schedule f/u appt with urologist - Dr. Mcpherson  Follow up as needed or at routine wellness visits

## 2025-01-08 NOTE — PROGRESS NOTES
"     Acute Office Visit      Date: 2025  Patient Name: Miguel Terrell  : 1965   MRN: [id number]     Chief Complaint   Patient presents with    Diabetes       History of Present Illness: Miguel Terrell is a 59 y.o. male who is here today for routine follow-up related to diabetes. He reports overall he is doing well and does not have specific concerns at this time.       Diabetes follow-up.  Symptoms are: recurrent.   Onset was at an unknown time.   Symptoms occur: monthly.  Pertinent negative symptoms include no abdominal pain, no anorexia, no joint pain, no change in stool, no chest pain, no chills, no congestion, no cough, no diaphoresis, no fatigue, no fever, no headaches, no joint swelling, no myalgias, no nausea, no neck pain, no numbness, no rash, no sore throat, no swollen glands, no dysuria, no vertigo, no visual change, no vomiting and no weakness.     Colonoscopy is scheduled for end      Subjective     Current Outpatient Medications   Medication Instructions    Blood Glucose Monitoring Suppl (FreeStyle Freedom Lite) w/Device kit 1 each, Not Applicable, 2 Times Daily    Lancets (freestyle) lancets No dose, route, or frequency recorded.    losartan (COZAAR) 50 mg, Oral, Daily    metFORMIN (GLUCOPHAGE) 500 mg, Oral, 2 Times Daily With Meals       Allergies   Allergen Reactions    Augmentin [Amoxicillin-Pot Clavulanate] Rash    Amoxicillin Itching, Hives and Unknown - High Severity       Objective     Vitals:    25 1530   BP: 146/80   BP Location: Right arm   Patient Position: Sitting   Cuff Size: Adult   Pulse: 83   Resp: 16   Temp: 98.9 °F (37.2 °C)   TempSrc: Oral   SpO2: 95%   Weight: 99.5 kg (219 lb 6.4 oz)   Height: 189.5 cm (74.61\")   PainSc: 0-No pain       Body mass index is 27.71 kg/m².     Physical Exam  Vitals and nursing note reviewed.   Constitutional:       General: He is not in acute distress.     Appearance: Normal appearance. He is not toxic-appearing.   HENT: "      Head: Normocephalic.   Eyes:      Pupils: Pupils are equal, round, and reactive to light.      Comments: Patient wearing glasses   Cardiovascular:      Rate and Rhythm: Normal rate and regular rhythm.      Heart sounds: No murmur heard.  Pulmonary:      Effort: Pulmonary effort is normal. No respiratory distress.      Breath sounds: Normal breath sounds.   Musculoskeletal:         General: Normal range of motion.      Cervical back: Normal range of motion and neck supple.      Right lower leg: No edema.      Left lower leg: No edema.   Skin:     General: Skin is warm and dry.   Neurological:      Mental Status: He is alert and oriented to person, place, and time.   Psychiatric:         Mood and Affect: Mood normal.         Behavior: Behavior normal.         Results for orders placed or performed in visit on 09/27/24   Basic Metabolic Panel    Collection Time: 09/27/24  8:56 AM    Specimen: Arm, Right; Blood    Blood  Release to [facility]   Result Value Ref Range    Glucose 122 (H) 70 - 99 mg/dL    BUN 16 8 - 27 mg/dL    Creatinine 0.93 0.76 - 1.27 mg/dL    EGFR Result 94 >59 mL/min/1.73    BUN/Creatinine Ratio 17 10 - 24    Sodium 139 134 - 144 mmol/L    Potassium 3.6 3.5 - 5.2 mmol/L    Chloride 102 96 - 106 mmol/L    Total CO2 23 20 - 29 mmol/L    Calcium 9.6 8.6 - 10.2 mg/dL   Lipid Panel    Collection Time: 09/27/24  8:56 AM    Specimen: Arm, Right; Blood    Blood  Release to [facility]   Result Value Ref Range    Total Cholesterol 107 100 - 199 mg/dL    Triglycerides 80 0 - 149 mg/dL    HDL Cholesterol 38 (L) >39 mg/dL    VLDL Cholesterol Jose 16 5 - 40 mg/dL    LDL Chol Calc (NIH) 53 0 - 99 mg/dL   CBC & Differential    Collection Time: 09/27/24  8:56 AM    Blood  Release to [facility]   Result Value Ref Range    WBC 6.7 3.4 - 10.8 x10E3/uL    RBC 4.84 4.14 - 5.80 x10E6/uL    Hemoglobin 14.9 13.0 - 17.7 g/dL    Hematocrit 46.0 37.5 - 51.0 %    MCV 95 79 - 97 fL    MCH 30.8 26.6 - 33.0 pg    MCHC 32.4 31.5 - 35.7 g/dL     RDW 13.3 11.6 - 15.4 %    Platelets 220 150 - 450 x10E3/uL    Neutrophil Rel % 75 Not Estab. %    Lymphocyte Rel % 14 Not Estab. %    Monocyte Rel % 9 Not Estab. %    Eosinophil Rel % 2 Not Estab. %    Basophil Rel % 0 Not Estab. %    Neutrophils Absolute 5.0 1.4 - 7.0 x10E3/uL    Lymphocytes Absolute 1.0 0.7 - 3.1 x10E3/uL    Monocytes Absolute 0.6 0.1 - 0.9 x10E3/uL    Eosinophils Absolute 0.1 0.0 - 0.4 x10E3/uL    Basophils Absolute 0.0 0.0 - 0.2 x10E3/uL    Immature Granulocyte Rel % 0 Not Estab. %    Immature Grans Absolute 0.0 0.0 - 0.1 x10E3/uL   POC Glycosylated Hemoglobin (Hb A1C)    Collection Time: 09/27/24 10:34 AM    Specimen: Blood   Result Value Ref Range    Hemoglobin A1C 6.3 (A) 4.5 - 5.7 %    Lot Number [id number]     Expiration Date 7,022,025         The ASCVD Risk score (Lakeshia COLEMAN, et al., 2019) failed to calculate for the following reasons:    The valid total cholesterol range is 130 to 320 mg/dL             Assessment / Plan      Diagnoses and all orders for this visit:    1. Type 2 diabetes mellitus with diabetic autonomic neuropathy, without long-term current use of insulin (Primary)  Assessment & Plan:  DM II improving with medications, diet and lifestyle changes  Compliant with medications     No hypoglycemic events  Checking blood sugars at home daily   History of neuropathy bilateral lower extremities-stable   Overdue for vision exam-pt to make appt with opth provider  Following low-carb, low-fat diet and exercising regularly    Plan:   Continue current medications  Medication changes - see orders  Continue diet and lifestyle changes  Follow up in  months, unless otherwise indicated  We will complete diabetic foot exam at annual wellness visit in May    Orders:  -     Pneumococcal Conjugate Vaccine 20-Valent (PCV20)  -     metFORMIN (GLUCOPHAGE) 500 MG tablet; Take 1 tablet by mouth 2 (Two) Times a Day With Meals.  Dispense: 60 tablet; Refill: 5    2. Primary hypertension  Assessment &  Plan:  Hypertension - elevated today- believes he forgot his morning dose of BP medication today  Endorses 1-2 missed doses per week, denies side effects   Does not report any headaches, blurry vision, dizziness, chest pain, shortness of breath, or palpitations.    Discussed barriers to taking medications consistently - pt to work on strategies to improve med compliance    Plan:  Continue current medications  Report back to the office if blood pressures consistently >140/90  Pt verbalizes understanding if he develops chest pain, shortness of breath or other alarm symptoms he should call 911 or go to the ER as appropriate.       3. Left renal stone  Assessment & Plan:  Previously passed renal calculi - at home following ER visit  Pt seen initially by Dr. Mcpherson and then for 1 f/u visit   No longer taking medications for renal stone  Pt to schedule f/u appt with urologist - Dr. Mcpherson  Follow up as needed or at routine wellness visits      4. Annual physical exam  Assessment & Plan:  Patient to return for annual wellness visit  Future lab orders placed today for patient to come in and have lab work completed prior to his appointment    Orders:  -     CBC Auto Differential; Future  -     Comprehensive Metabolic Panel; Future  -     Hemoglobin A1c; Future  -     Lipid Panel; Future  -     TSH Rfx On Abnormal To Free T4; Future    Follow Up:   Return in about 4 months (around 5/8/2025) for Annual physical, patient will need labs before next visit please.    Patient Education:   Reviewed medications, potential side effects and signs and symptoms to report.   Discussed risk vs benefits of treatment plan with patient including medications, labs, and imaging.    Patient education materials attached to AVS and reviewed with patient during office visit today.   Addressed questions and concerns during visit. Patient verbalized understanding and agrees with tx plan.   Instructed to call the office with any questions and  report to ER with any life-threatening symptoms.    KENZIE Wheeler (Libby) [facility]  [address] [facility] [id number] [phone]    NOTE TO PATIENT:   The 21st Century Cures Act makes medical notes like these available to patients in the interest of transparency. However, be advised this is a medical document. It is intended as peer to peer communication. It is written in medical language and may contain abbreviations or verbiage that are unfamiliar. It may appear blunt or direct. Medical documents are intended to carry relevant information, facts as evident, and the clinical opinion of the practitioner.     EMR Dragon/Transcription disclaimer:   Much of this encounter note is an electronic transcription of spoken language to printed text. Electronic transcription of spoken language may permit erroneous, or at times, nonsensical words or phrases to be inadvertently transcribed. Although I have reviewed the note for such errors, some may still exist.

## 2025-01-08 NOTE — ASSESSMENT & PLAN NOTE
Patient to return for annual wellness visit  Future lab orders placed today for patient to come in and have lab work completed prior to his appointment

## 2025-05-27 ENCOUNTER — OFFICE VISIT (OUTPATIENT)
Dept: FAMILY MEDICINE CLINIC | Facility: CLINIC | Age: 60
End: 2025-05-27
Payer: COMMERCIAL

## 2025-05-27 VITALS
TEMPERATURE: 97.5 F | BODY MASS INDEX: 26.97 KG/M2 | OXYGEN SATURATION: 98 % | WEIGHT: 216.9 LBS | DIASTOLIC BLOOD PRESSURE: 90 MMHG | HEIGHT: 75 IN | HEART RATE: 88 BPM | SYSTOLIC BLOOD PRESSURE: 160 MMHG

## 2025-05-27 DIAGNOSIS — I10 PRIMARY HYPERTENSION: ICD-10-CM

## 2025-05-27 DIAGNOSIS — Z11.59 NEED FOR HEPATITIS C SCREENING TEST: ICD-10-CM

## 2025-05-27 DIAGNOSIS — Z00.00 ANNUAL PHYSICAL EXAM: ICD-10-CM

## 2025-05-27 DIAGNOSIS — E11.43 TYPE 2 DIABETES MELLITUS WITH DIABETIC AUTONOMIC NEUROPATHY, WITHOUT LONG-TERM CURRENT USE OF INSULIN: Primary | ICD-10-CM

## 2025-05-27 DIAGNOSIS — K50.80 CROHN'S DISEASE OF BOTH SMALL AND LARGE INTESTINE WITHOUT COMPLICATION: ICD-10-CM

## 2025-05-27 RX ORDER — LOSARTAN POTASSIUM 100 MG/1
100 TABLET ORAL DAILY
Qty: 30 TABLET | Refills: 1 | Status: SHIPPED | OUTPATIENT
Start: 2025-05-27

## 2025-05-27 RX ORDER — SULFASALAZINE 500 MG/1
500 TABLET, DELAYED RELEASE ORAL 3 TIMES DAILY
COMMUNITY

## 2025-05-27 NOTE — PROGRESS NOTES
Annual Wellness Exam     Date: 2025   Patient Name: Miguel Terrell  : 1965   MRN: [id number]     Chief Complaint   Patient presents with    Diabetes    Hypertension    Foot Pain       History of Present Illness: Miguel Terrell is a 60 y.o. male who is here today for his annual health maintenance physical.  In addition to his annual wellness exam.  Patient reports overall he is doing well, is taking medications as directed although she did miss his blood pressure medications this morning and states this is why blood pressure is elevated for his appointment today.  Patient denies any new illness or injury since last visit. Denies falls, unexplained weight change, fevers, chills, or other constitutional symptoms.    DM II:  Checking blood sugars 2-3 times per week (ave about 120 - 130)    HTN:  Taking medications as directed   Elevated today   Occasionally missing doses of med prior to the last few weeks    GI:  Hx of Crohns Disease  Treating with GI provider     Subjective     Past Medical History:   Diagnosis Date    Closed fracture of distal end of radius 2021    Other intraarticular fracture of lower end of right radius, subsequent encounter for closed fracture with routine healing  Other closed intra-articular fracture of distal end of right radius with routine healing, subsequent encounter  Other closed intra-articular fracture of distal end of right radius, initial encounter      Diabetes mellitus     Hypertension     Inflammatory bowel disease 2018    Kidney stone     Pre-diabetes 2024    Goal: HgbA1c < 7       Prediabetes         Family History   Problem Relation Age of Onset    Cancer Father     Kidney disease Father     Cancer Maternal Grandfather     Cancer Brother         Social History     Socioeconomic History    Marital status: Single   Tobacco Use    Smoking status: Former     Current packs/day: 0.00     Average packs/day: 1 pack/day for 20.0 years (20.0 ttl pk-yrs)      Types: Cigarettes     Start date: 1980     Quit date: 2000     Years since quittin.4     Passive exposure: Past    Smokeless tobacco: Never   Vaping Use    Vaping status: Never Used   Substance and Sexual Activity    Alcohol use: Not Currently    Drug use: Never    Sexual activity: Not Currently       Past Medical History, Social History, Family History and Care Team were all reviewed with patient and updated as appropriate.     Current Outpatient Medications   Medication Instructions    Blood Glucose Monitoring Suppl (FreeStyle Freedom Lite) w/Device kit 1 each, Not Applicable, 2 Times Daily    Lancets (freestyle) lancets No dose, route, or frequency recorded.    losartan (COZAAR) 100 mg, Oral, Daily    metFORMIN (GLUCOPHAGE) 500 mg, Oral, 2 Times Daily With Meals    sulfaSALAzine (AZULFIDINE ENTABS) 500 mg, 3 Times Daily        Allergies   Allergen Reactions    Augmentin [Amoxicillin-Pot Clavulanate] Rash    Amoxicillin Itching, Hives and Unknown - High Severity       Health Maintenance Summary            Current Care Gaps       HEMOGLOBIN A1C (Every 6 Months) Overdue since 3/27/2025      2025  Order placed for Hemoglobin A1c by Tawana Nettles APRN    2024  Hemoglobin A1C component of POC Glycosylated Hemoglobin (Hb A1C)    2024  Hemoglobin A1C component of Hemoglobin A1c                      Awaiting Completion       URINE MICROALBUMIN-CREATININE RATIO (uACR) (Yearly) Order placed this encounter      2025  Order placed for Microalbumin / Creatinine Urine Ratio - Urine, Clean Catch by Tawana Nettles APRN              HEPATITIS C SCREENING (Once) Order placed this encounter      2025  Order placed for Hepatitis C Antibody by Tawana Nettles APRN                      Upcoming       COVID-19 Vaccine (3 - 2024-25 season) Postponed until 6/10/2025      2025  Postponed until 6/10/2025 by Tawana Nettles APRN (Product Unavailable - pt deferred to  pharmacy to complete vaccination)    01/08/2025  Postponed until 3/30/2025 by Layla West MA (Product Unavailable)    05/31/2024  Postponed until 8/20/2024 by Junie Paulson MA (Product Unavailable)    09/10/2021  Imm Admin: COVID-19 (MODERNA) 1st,2nd,3rd Dose Monovalent    08/13/2021  Imm Admin: COVID-19 (MODERNA) 1st,2nd,3rd Dose Monovalent     Only the first 5 history entries have been loaded, but more history exists.            ZOSTER VACCINE (1 of 2) Postponed until 6/10/2025      05/27/2025  Postponed until 6/10/2025 by Tawana Nettles APRN (Product Unavailable - deferred to pt pharmacy to complete his vaccination)    01/08/2025  Postponed until 1/8/2025 by Layla West MA (Product Unavailable)    05/31/2024  Postponed until 5/31/2024 by Junie Paulson MA (Product Unavailable)              ANNUAL PHYSICAL (Yearly) Next due on 5/31/2025 05/31/2024  Done              INFLUENZA VACCINE (Yearly - July to March) Next due on 7/1/2025 09/27/2024  Imm Admin: Fluzone  >6mos    11/17/2021  Imm Admin: Influenza Seasonal Injectable    11/07/2019  Imm Admin: 31-influenza Vac Quardvalent Preservativ    11/15/2018  Imm Admin: 31-influenza Vac Quardvalent Preservativ              DIABETIC EYE EXAM (Yearly) Next due on 2/28/2026 02/28/2025  Patient-Reported (Performed Externally)    01/08/2025  Postponed until 2/26/2025 by Tawana Nettles APRN (Pending event)    10/18/2022  SCANNED - EYE EXAM              DIABETIC FOOT EXAM (Yearly) Next due on 5/27/2026 05/27/2025  Done    01/08/2025  Postponed until 5/8/2025 by Tawana Nettles APRN (Pending event - Will complete at annual wellness visit)              COLORECTAL CANCER SCREENING (COLONOSCOPY - Every 5 Years) Next due on 1/29/2030 02/18/2025  Follow-up changed to COLONOSCOPY - Every 5 Years by Tawana Nettles APRN (Medical Decision - Follow-up Confirmed Automatically - Results Review)    01/29/2025  Colonoscopy, Scan     01/08/2025  Postponed until 1/31/2025 by Tawana Nettles APRN (Pending event - colonoscopy scheduled)    01/08/2025  Follow-up changed to COLONOSCOPY - Every 5 Years by Tawana Nettles APRN (Medical Decision - Previous hx of polyps)    03/13/2019  COLONOSCOPY (Patient-Reported (Performed Externally))      Only the first 5 history entries have been loaded, but more history exists.              TDAP/TD VACCINES (2 - Td or Tdap) Next due on 5/31/2034 05/31/2024  Imm Admin: Tdap                      Completed or No Longer Recommended       Pneumococcal Vaccine 50+ (Series Information) Completed      01/08/2025  Imm Admin: Pneumococcal Conjugate 20-Valent (PCV20)                            No orders of the defined types were placed in this encounter.      The ASCVD Risk score (Lakeshia COLEMAN, et al., 2019) failed to calculate for the following reasons:    The valid total cholesterol range is 130 to 320 mg/dL    Tobacco Use: Medium Risk (5/27/2025)    Patient History     Smoking Tobacco Use: Former     Smokeless Tobacco Use: Never     Passive Exposure: Past       Social History     Substance and Sexual Activity   Alcohol Use Not Currently        Social History     Substance and Sexual Activity   Drug Use Never        Diet/Physical activity: Discussed importance of healthy diet and daily exercise. Patient reports making efforts toward positive health goals.    Sexual health: Patient denies concerns today    PHQ-2 Depression Screening  Little interest or pleasure in doing things? Not at all   Feeling down, depressed, or hopeless? Not at all   PHQ-2 Total Score 0        Measures     Advanced Care Planning:   Patient does not have an advance directive, information provided.    Smoking Cessation Counseling: N/A     Objective     Physical Exam  Vitals and nursing note reviewed.   Constitutional:       General: He is not in acute distress.  HENT:      Head: Normocephalic.      Right Ear: Tympanic membrane normal.       Left Ear: Tympanic membrane normal.      Nose: Nose normal.      Mouth/Throat:      Mouth: Mucous membranes are moist.   Eyes:      Pupils: Pupils are equal, round, and reactive to light.   Neck:      Thyroid: No thyromegaly or thyroid tenderness.   Cardiovascular:      Rate and Rhythm: Normal rate and regular rhythm.      Pulses:           Dorsalis pedis pulses are 2+ on the right side and 2+ on the left side.        Posterior tibial pulses are 1+ on the right side and 1+ on the left side.      Heart sounds: Normal heart sounds.   Pulmonary:      Effort: Pulmonary effort is normal.      Breath sounds: Normal breath sounds.   Abdominal:      General: Bowel sounds are normal.      Palpations: Abdomen is soft.   Musculoskeletal:         General: Normal range of motion.      Cervical back: Normal range of motion.      Right lower leg: No edema.      Left lower leg: No edema.      Right foot: Normal range of motion. No deformity, bunion, Charcot foot, foot drop or prominent metatarsal heads.      Left foot: Normal range of motion. No deformity, bunion, Charcot foot, foot drop or prominent metatarsal heads.   Feet:      Right foot:      Protective Sensation: 6 sites tested.  6 sites sensed.      Skin integrity: Skin integrity normal. No ulcer or skin breakdown.      Toenail Condition: Right toenails are normal.      Left foot:      Protective Sensation: 6 sites tested.  6 sites sensed.      Skin integrity: Skin integrity normal. No ulcer or skin breakdown.      Toenail Condition: Left toenails are normal.   Lymphadenopathy:      Cervical: No cervical adenopathy.   Skin:     General: Skin is warm and dry.      Capillary Refill: Capillary refill takes less than 2 seconds.   Neurological:      Mental Status: He is alert and oriented to person, place, and time.   Psychiatric:         Mood and Affect: Mood normal.         Behavior: Behavior normal.       Vitals:    05/27/25 1259   BP: 160/90   Pulse: 88   Temp: 97.5 °F (36.4  "°C)   SpO2: 98%   Weight: 98.4 kg (216 lb 14.4 oz)   Height: 189.5 cm (74.61\")   PainSc: 0-No pain     Body mass index is 27.4 kg/m².     Common labs          9/27/2024    08:56 9/27/2024    10:34   Common Labs   Glucose 122     BUN 16     Creatinine 0.93     Sodium 139     Potassium 3.6     Chloride 102     Calcium 9.6     WBC 6.7     Hemoglobin 14.9     Hematocrit 46.0     Platelets 220     Total Cholesterol 107     Triglycerides 80     HDL Cholesterol 38     LDL Cholesterol  53     Hemoglobin A1C  6.3        Assessment / Plan         Annual physical exam  Miguel Cariasmamary  annual wellness exam  Continue discussing health goals and care gaps we addressed Miguel's chronic HTN and DM II    Recently completed diagnostic testing was reviewed today and all questions addressed        Type 2 diabetes mellitus with diabetic autonomic neuropathy, without long-term current use of insulin  DM II improving with medications, diet and lifestyle changes  Most recent HgbA1c = 6.5%  Compliant with medications     No hypoglycemic events  Checking blood sugars at home   Denies foot pain or paresthesia  Seen by ophthalmologist within the past 12 months  Following low-carb, low-fat diet and exercising regularly    Plan:   Continue current medications :  Continue diet and lifestyle changes  Follow up in  months, unless otherwise indicated    Orders:    Microalbumin / Creatinine Urine Ratio - Urine, Clean Catch    Primary hypertension  Hypertension improving with treatment  Ports occasional missed doses and he did not take his medication this morning  Denies missed doses, denies side effects   Does not report any headaches, blurry vision, dizziness, chest pain, shortness of breath, or palpitations.      Plan:  Change  current medications  Reinforced importance of consistently taking antihypertensive medications  Patient agrees to monitor home blood pressures and bring log sheet to follow-up visit with PCP  Report back to the office if " blood pressures consistently >140/90  Pt verbalizes understanding if he develops chest pain, shortness of breath or other alarm symptoms he should call 911 or go to the ER as appropriate.    Orders:    losartan (Cozaar) 100 MG tablet; Take 1 tablet by mouth Daily.    Crohn's disease of both small and large intestine without complication  Treating regularly with GI provider in [facility] with colonoscopies and GI prescribing medication for Crohns  No change in tx regimen today  F/u as needed        Need for hepatitis C screening test    Orders:    Hepatitis C Antibody; Future      Healthcare Maintenance:  Counseling provided based on age appropriate USPSTF guidelines.         Recommendations:  begin progressive daily aerobic exercise program and follow a low fat, low cholesterol diet    Miguel Cariasparamjit voices understanding and acceptance of this advice and will call back with any further questions or concerns. AVS with preventive healthcare tips printed for patient.       Follow Up:   Return in about 4 weeks (around 6/24/2025) for Hypertension recheck, Diabetes recheck.    Patient Education:   Reviewed medications, potential side effects and signs and symptoms to report.   Discussed risk vs benefits of treatment plan with patient including medications, labs, and imaging.    Patient education materials attached to AVS and reviewed with patient during office visit today.   Addressed questions and concerns during visit. Patient verbalized understanding and agrees with tx plan.   Instructed to call the office with any questions and report to ER with any life-threatening symptoms.       KENZIE Wheeler (Libby) [facility]  [address] [facility] [id number] [phone]    NOTE TO PATIENT:   The 21st Century Cures Act makes medical notes like these available to patients in the interest of transparency. However, be advised this is a medical document. It is intended  as peer to peer communication. It is written in medical language and may contain abbreviations or verbiage that are unfamiliar. It may appear blunt or direct. Medical documents are intended to carry relevant information, facts as evident, and the clinical opinion of the practitioner.     EMR Dragon/Transcription disclaimer:   Much of this encounter note is an electronic transcription of spoken language to printed text. Electronic transcription of spoken language may permit erroneous, or at times, nonsensical words or phrases to be inadvertently transcribed. Although I have reviewed the note for such errors, some may still exist.

## 2025-05-27 NOTE — ASSESSMENT & PLAN NOTE
Miguel Terrell  annual wellness exam  Continue discussing health goals and care gaps we addressed Miguel's chronic HTN and DM II    Recently completed diagnostic testing was reviewed today and all questions addressed

## 2025-05-27 NOTE — ASSESSMENT & PLAN NOTE
Hypertension improving with treatment  Ports occasional missed doses and he did not take his medication this morning  Denies missed doses, denies side effects   Does not report any headaches, blurry vision, dizziness, chest pain, shortness of breath, or palpitations.      Plan:  Change  current medications  Reinforced importance of consistently taking antihypertensive medications  Patient agrees to monitor home blood pressures and bring log sheet to follow-up visit with PCP  Report back to the office if blood pressures consistently >140/90  Pt verbalizes understanding if he develops chest pain, shortness of breath or other alarm symptoms he should call 911 or go to the ER as appropriate.    Orders:    losartan (Cozaar) 100 MG tablet; Take 1 tablet by mouth Daily.

## 2025-05-27 NOTE — ASSESSMENT & PLAN NOTE
DM II improving with medications, diet and lifestyle changes  Most recent HgbA1c = 6.5%  Compliant with medications     No hypoglycemic events  Checking blood sugars at home   Denies foot pain or paresthesia  Seen by ophthalmologist within the past 12 months  Following low-carb, low-fat diet and exercising regularly    Plan:   Continue current medications :  Continue diet and lifestyle changes  Follow up in  months, unless otherwise indicated    Orders:    Microalbumin / Creatinine Urine Ratio - Urine, Clean Catch

## 2025-05-27 NOTE — ASSESSMENT & PLAN NOTE
Treating regularly with GI provider in [place]  UTD with colonoscopies and GI prescribing medication for Crohns  No change in tx regimen today  F/u as needed

## 2025-05-27 NOTE — PATIENT INSTRUCTIONS

## 2025-05-28 LAB
ALBUMIN SERPL-MCNC: 4.8 G/DL (ref 3.9–4.9)
ALBUMIN/CREAT UR: 10 MG/G CREAT (ref 0–29)
ALP SERPL-CCNC: 64 IU/L (ref 44–121)
ALT SERPL-CCNC: 26 IU/L (ref 0–44)
AST SERPL-CCNC: 25 IU/L (ref 0–40)
BASOPHILS # BLD AUTO: 0.1 X10E3/UL (ref 0–0.2)
BASOPHILS NFR BLD AUTO: 1 %
BILIRUB SERPL-MCNC: 0.9 MG/DL (ref 0–1.2)
BUN SERPL-MCNC: 12 MG/DL (ref 8–27)
BUN/CREAT SERPL: 15 (ref 10–24)
CALCIUM SERPL-MCNC: 10.1 MG/DL (ref 8.6–10.2)
CHLORIDE SERPL-SCNC: 102 MMOL/L (ref 96–106)
CHOLEST SERPL-MCNC: 113 MG/DL (ref 100–199)
CO2 SERPL-SCNC: 20 MMOL/L (ref 20–29)
CREAT SERPL-MCNC: 0.81 MG/DL (ref 0.76–1.27)
CREAT UR-MCNC: 131.5 MG/DL
EGFRCR SERPLBLD CKD-EPI 2021: 100 ML/MIN/1.73
EOSINOPHIL # BLD AUTO: 0.3 X10E3/UL (ref 0–0.4)
EOSINOPHIL NFR BLD AUTO: 5 %
ERYTHROCYTE [DISTWIDTH] IN BLOOD BY AUTOMATED COUNT: 13.1 % (ref 11.6–15.4)
GLOBULIN SER CALC-MCNC: 2.6 G/DL (ref 1.5–4.5)
GLUCOSE SERPL-MCNC: 172 MG/DL (ref 70–99)
HBA1C MFR BLD: 6.5 % (ref 4.8–5.6)
HCT VFR BLD AUTO: 52.2 % (ref 37.5–51)
HCV IGG SERPL QL IA: NON REACTIVE
HDLC SERPL-MCNC: 39 MG/DL
HGB BLD-MCNC: 17.4 G/DL (ref 13–17.7)
IMM GRANULOCYTES # BLD AUTO: 0 X10E3/UL (ref 0–0.1)
IMM GRANULOCYTES NFR BLD AUTO: 0 %
LDLC SERPL CALC-MCNC: 52 MG/DL (ref 0–99)
LYMPHOCYTES # BLD AUTO: 1.1 X10E3/UL (ref 0.7–3.1)
LYMPHOCYTES NFR BLD AUTO: 15 %
MCH RBC QN AUTO: 31.2 PG (ref 26.6–33)
MCHC RBC AUTO-ENTMCNC: 33.3 G/DL (ref 31.5–35.7)
MCV RBC AUTO: 94 FL (ref 79–97)
MICROALBUMIN UR-MCNC: 12.9 UG/ML
MONOCYTES # BLD AUTO: 0.6 X10E3/UL (ref 0.1–0.9)
MONOCYTES NFR BLD AUTO: 7 %
NEUTROPHILS # BLD AUTO: 5.4 X10E3/UL (ref 1.4–7)
NEUTROPHILS NFR BLD AUTO: 72 %
PLATELET # BLD AUTO: 277 X10E3/UL (ref 150–450)
POTASSIUM SERPL-SCNC: 4 MMOL/L (ref 3.5–5.2)
PROT SERPL-MCNC: 7.4 G/DL (ref 6–8.5)
RBC # BLD AUTO: 5.57 X10E6/UL (ref 4.14–5.8)
SODIUM SERPL-SCNC: 138 MMOL/L (ref 134–144)
TRIGL SERPL-MCNC: 124 MG/DL (ref 0–149)
TSH SERPL DL<=0.005 MIU/L-ACNC: 0.86 UIU/ML (ref 0.45–4.5)
VLDLC SERPL CALC-MCNC: 22 MG/DL (ref 5–40)
WBC # BLD AUTO: 7.5 X10E3/UL (ref 3.4–10.8)

## 2025-06-24 ENCOUNTER — OFFICE VISIT (OUTPATIENT)
Dept: FAMILY MEDICINE CLINIC | Facility: CLINIC | Age: 60
End: 2025-06-24
Payer: COMMERCIAL

## 2025-06-24 VITALS
OXYGEN SATURATION: 100 % | RESPIRATION RATE: 16 BRPM | DIASTOLIC BLOOD PRESSURE: 62 MMHG | TEMPERATURE: 98.5 F | SYSTOLIC BLOOD PRESSURE: 110 MMHG | BODY MASS INDEX: 26.12 KG/M2 | WEIGHT: 210.1 LBS | HEART RATE: 83 BPM | HEIGHT: 75 IN

## 2025-06-24 DIAGNOSIS — E11.43 TYPE 2 DIABETES MELLITUS WITH DIABETIC AUTONOMIC NEUROPATHY, WITHOUT LONG-TERM CURRENT USE OF INSULIN: ICD-10-CM

## 2025-06-24 DIAGNOSIS — I10 PRIMARY HYPERTENSION: ICD-10-CM

## 2025-06-24 DIAGNOSIS — E66.3 OVERWEIGHT (BMI 25.0-29.9): Primary | ICD-10-CM

## 2025-06-24 PROCEDURE — 99214 OFFICE O/P EST MOD 30 MIN: CPT

## 2025-06-24 RX ORDER — LOSARTAN POTASSIUM 100 MG/1
100 TABLET ORAL DAILY
Qty: 90 TABLET | Refills: 1 | Status: SHIPPED | OUTPATIENT
Start: 2025-06-24

## 2025-06-24 NOTE — PROGRESS NOTES
Office Visit      Date:  2025   Patient Name: Miguel Terrell  : 1965   MRN: [id number]     Chief Complaint   Patient presents with    Diabetes       History of Present Illness  The patient is a 60-year-old male who presents today for a routine follow-up visit for hypertension.    He reports an improvement in his blood pressure, with readings consistently around 100, and was surprised to see a reading of 110/62. He mentions experiencing a cold feeling in his feet and a spongy sensation when walking. He has been monitoring his feet for any sores and avoids walking barefoot.    His blood sugar levels have also improved, with a recent reading of 94. He attributes this to not eating upon waking up due to lack of hunger. He has been making dietary changes, including reducing his intake of bread and increasing his consumption of fruits such as bananas and apples. He has also been taking potassium supplements and glucosamine regularly. He has lost 6 pounds in the last month, bringing his weight down to 210 pounds. His goal is to reach 200 pounds and maintain it. He has been engaging in regular physical activity, including walking, although he admits to missing some sessions due to hot weather. He reports no feelings of dizziness or unwellness but does experience occasional headaches, which he believes are due to a decrease in his caffeine intake. He maintains hydration by drinking water throughout the day and consumes skim milk with his lunch.    He mentions that he was let go from his job due to economic reasons and is currently not working. He hopes to find a job that allows him to move around more. He has a dental appointment scheduled for next month and plans to get his teeth checked before his insurance runs out in October.    SOCIAL HISTORY  He is currently unemployed.    Blood sugars ok and he is losing weight  6 lb weight loss in the past month  Decreasing portion sizes      Subjective      Past  Medical History:   Diagnosis Date    Closed fracture of distal end of radius 2021    Other intraarticular fracture of lower end of right radius, subsequent encounter for closed fracture with routine healing  Other closed intra-articular fracture of distal end of right radius with routine healing, subsequent encounter  Other closed intra-articular fracture of distal end of right radius, initial encounter      Diabetes mellitus     Hypertension     Inflammatory bowel disease 2018    Kidney stone     Pre-diabetes 2024    Goal: HgbA1c < 7       Prediabetes     Sickle cell anemia        Past Surgical History:   Procedure Laterality Date    COLONOSCOPY  2019    FRACTURE SURGERY Right     ORIF - retained hardware right wrist       Family History   Problem Relation Age of Onset    Cancer Father     Kidney disease Father     Cancer Maternal Grandfather     Cancer Brother        Social History     Socioeconomic History    Marital status: Single   Tobacco Use    Smoking status: Former     Current packs/day: 0.00     Average packs/day: 1 pack/day for 20.0 years (20.0 ttl pk-yrs)     Types: Cigarettes     Start date: 1980     Quit date: 2000     Years since quittin.4     Passive exposure: Past    Smokeless tobacco: Never    Tobacco comments:     smoke free 25 y   Vaping Use    Vaping status: Never Used   Substance and Sexual Activity    Alcohol use: Not Currently    Drug use: Never    Sexual activity: Not Currently       Current Outpatient Medications   Medication Instructions    Blood Glucose Monitoring Suppl (FreeStyle Freedom Lite) w/Device kit 1 each, Not Applicable, 2 Times Daily    Lancets (freestyle) lancets No dose, route, or frequency recorded.    losartan (COZAAR) 100 mg, Oral, Daily    metFORMIN (GLUCOPHAGE) 500 mg, Oral, 2 Times Daily With Meals    sulfaSALAzine (AZULFIDINE ENTABS) 500 mg, 3 Times Daily       Allergies   Allergen Reactions    Augmentin [Amoxicillin-Pot Clavulanate] Rash  "   Amoxicillin Itching, Hives and Unknown - High Severity       Objective     Physical Exam  Respiratory: Clear to auscultation, no wheezing, rales or rhonchi  Cardiovascular: Regular rate and rhythm, no murmurs, rubs, or gallops  Extremities: No swelling in the ankles    Physical Exam  Vitals and nursing note reviewed.   Constitutional:       General: He is not in acute distress.  Eyes:      Pupils: Pupils are equal, round, and reactive to light.   Cardiovascular:      Rate and Rhythm: Normal rate and regular rhythm.   Pulmonary:      Effort: Pulmonary effort is normal.      Breath sounds: Normal breath sounds.   Musculoskeletal:         General: Normal range of motion.      Right lower leg: No edema.      Left lower leg: No edema.   Skin:     General: Skin is warm and dry.   Neurological:      Mental Status: He is alert and oriented to person, place, and time.   Psychiatric:         Mood and Affect: Mood normal.       Vitals:    06/24/25 1309   BP: 110/62   BP Location: Right arm   Patient Position: Sitting   Cuff Size: Large Adult   Pulse: 83   Resp: 16   Temp: 98.5 °F (36.9 °C)   TempSrc: Oral   SpO2: 100%   Weight: 95.3 kg (210 lb 1.6 oz)   Height: 189.5 cm (74.61\")   PainSc: 0-No pain     Body mass index is 26.54 kg/m².     The ASCVD Risk score (Lakeshia COLEMAN, et al., 2019) failed to calculate for the following reasons:    The valid total cholesterol range is 130 to 320 mg/dL    Common labs          9/27/2024    08:56 9/27/2024    10:34 5/27/2025    13:39 5/27/2025    13:43   Common Labs   Glucose 122   172     BUN 16   12     Creatinine 0.93   0.81     Sodium 139   138     Potassium 3.6   4.0     Chloride 102   102     Calcium 9.6   10.1     Albumin   4.8     Total Bilirubin   0.9     Alkaline Phosphatase   64     AST (SGOT)   25     ALT (SGPT)   26     WBC 6.7   7.5     Hemoglobin 14.9   17.4     Hematocrit 46.0   52.2     Platelets 220   277     Total Cholesterol 107   113     Triglycerides 80   124     HDL " Cholesterol 38   39     LDL Cholesterol  53   52     Hemoglobin A1C  6.3  6.5     Microalbumin, Urine    12.9        Assessment / Plan      Assessment & Plan  1. Hypertension.  - Blood pressure readings have shown improvement, currently at 110/62.  - Physical exam shows no swelling in ankles.  - Advised to monitor blood pressure regularly and report any instances of low readings.  - Prescription for losartan renewed for 6 months.    2. Diabetes Mellitus.  - Blood sugar levels have been stable, with a recent reading of 94.  - Advised to maintain a balanced diet rich in protein and low in carbohydrates, and to ensure adequate hydration.  - Current regimen of metformin 500 mg twice daily with meals will be continued.  - Advised to monitor blood sugar levels regularly and report any instances of hypoglycemia.    3. Weight Management.  - Lost 6 pounds in the last month, bringing weight down to 210 pounds.  - Advised to continue efforts to lose weight, aiming for a goal weight of 200 pounds.  - Encouraged to maintain regular physical activity, such as walking, and to consider alternative indoor activities during hot weather.  - Advised to check feet daily for any sores and avoid going barefoot.    4. Health Maintenance.  - Advised to schedule follow-up appointment with lab work 1 week prior to appointment-recommend lab surveillance every 6 months at least to monitor diabetes and blood pressure medication.  - Advised to keep upcoming dental appointment next month.  - 6 months routine wellness check in November and annual wellness visit due in 05/2026.  - Advised to report any insurance issues or changes in coverage that might affect him keeping his appointments.    Follow-up  The patient will follow up in 6 months  Diabetes and hypertension recheck    Medical Diagnoses  Hypertension  Diabetes Mellitus  Weight management - BMI 26.54     Treatment Plans and Recommendations  Prescription for losartan renewed for 6  months.  Current regimen of metformin 500 mg twice daily with meals will be continued.  Monitor blood pressure regularly and report any instances of low readings.  Maintain balanced diet rich in protein and low in carbohydrates, and to ensure adequate hydration.  Monitor blood sugar levels regularly and report any instances of hypoglycemia.  Advised to continue efforts to lose weight, aiming for a goal weight of 200 pounds.  Encouraged to maintain regular physical activity, such as walking, and to consider alternative indoor activities during hot weather.  Advised to check feet daily for any sores and avoid going barefoot.  Advised to schedule a lab appointment to check blood sugars and chemistries for blood pressure medications every 6 months.  Advised to keep upcoming dental appointment next month.  Annual wellness visit due in 05/2026.  Advised to report any insurance issues or changes in coverage that may affect follow-up.  Follow-up in 6 months.     BMI Follow up:  BMI is >= 25 and <30. (Overweight) The following options were offered after discussion;: exercise counseling/recommendations, nutrition counseling/recommendations, and information on healthy weight added to patient's after visit summary       Patient Education:   Reviewed medications, potential side effects and signs and symptoms to report.   Discussed risk vs benefits of treatment plan with patient including medications, labs, and imaging.    Patient education materials attached to AVS and reviewed with patient during office visit today.   Addressed questions and concerns during visit. Patient verbalized understanding and agrees with tx plan.   Instructed to call the office with any questions and report to ER with any life-threatening symptoms.    Return in about 5 months (around 11/24/2025) for Recheck, patient will need labs before next visit please.    KENZIE Wheeler (Libby)  [facility]   [address]  [place] [address]  [phone]    Patient or patient representative verbalized consent for the use of Ambient Listening during the visit with  KENZIE Hoffman for chart documentation. 6/24/2025  13:32 EDT    NOTE TO PATIENT: The 21st Century Cures Act makes medical notes like these available to patients in the interest of transparency. However, be advised this is a medical document. It is intended as peer to peer communication. It is written in medical language and may contain abbreviations or verbiage that are unfamiliar. It may appear blunt or direct. Medical documents are intended to carry relevant information, facts as evident, and the clinical opinion of the practitioner.      EMR Dragon/Transcription disclaimer: Much of this encounter note is an electronic transcription of spoken language to printed text. Electronic transcription of spoken language may permit erroneous, or at times, nonsensical words or phrases to be inadvertently transcribed. Although I have reviewed the note for such errors, some may still exist.

## 2025-06-24 NOTE — ASSESSMENT & PLAN NOTE
DM II improving with medications, diet and lifestyle changes  Most recent HgbA1c = 6.5%  Compliant with medications     No hypoglycemic events  Checking blood sugars at home   Seen by ophthalmologist within the past 12 months  Following low-carb, low-fat diet and exercising regularly    Plan:   Continue current medications   Continue diet and lifestyle changes  Follow up in 5 months, unless otherwise indicated    Exercise Recommendations:   - Work up to goal of 30 min of walking per day - may split into 15 minute intervals if feasible  - Walking speed should be faster than a casual stroll but still able to carry on normal conversation      Orders:    metFORMIN (GLUCOPHAGE) 500 MG tablet; Take 1 tablet by mouth 2 (Two) Times a Day With Meals.    CBC Auto Differential; Future    Comprehensive Metabolic Panel; Future    Hemoglobin A1c; Future    Lipid Panel; Future

## 2025-06-24 NOTE — PATIENT INSTRUCTIONS

## 2025-06-24 NOTE — ASSESSMENT & PLAN NOTE
Hypertension stable  Taking medications as directed   Denies missed doses, denies side effects   Does not report any headaches, blurry vision, dizziness, chest pain, shortness of breath, or palpitations.      Plan:  Continue current medications  Patient agrees to monitor home blood pressures   Report back to the office if blood pressures consistently >140/90  Pt verbalizes understanding if he develops chest pain, shortness of breath or other alarm symptoms he should call 911 or go to the ER as appropriate.     Orders:    losartan (Cozaar) 100 MG tablet; Take 1 tablet by mouth Daily.    CBC Auto Differential; Future    Comprehensive Metabolic Panel; Future    Lipid Panel; Future